# Patient Record
(demographics unavailable — no encounter records)

---

## 2024-12-13 NOTE — DISCUSSION/SUMMARY
[FreeTextEntry1] : Negative ETT NUCLAER at good work load  Target LDL < 70  Target HGAIC < 7  consider ARB  or ACE for baseline AODM  [EKG obtained to assist in diagnosis and management of assessed problem(s)] : EKG obtained to assist in diagnosis and management of assessed problem(s)

## 2024-12-13 NOTE — HISTORY OF PRESENT ILLNESS
[FreeTextEntry1] : 11/3/23  Patient presents for cardiac evaluation this is a 76 year male    with risk factors which include diabetes mellitus hypertension smoking diabetes and family history NO h/o of CAD / CVA , obstructive sleep apnea. Brother has h/o CAD age 73 (non smoker)  Active in general no angina or dyspnea  Has mild douglas with steps  no edema no PVD claudication  12/08/23 recent ETT neg ischemia  baseline RBBB  on AODM  was given meds for LDL   12/13/24 seen here for RBBB and HTN on meds for AODM  -130

## 2024-12-13 NOTE — ASSESSMENT
[FreeTextEntry1] : Negative ETT NUCLEAR  at good work load  Target LDL < 70  Target HGAIC < 7  consider ARB  or ACE for baseline AODM  RBBB   no syncope or near syncope  Lvef 55 MILD AI PI   Lifestyle changes for control of BP reviewed ie wgt reduction, salt restriction, Etoh avoidance, exercise 30 min aerobic activity per day, avoid NSAID use self-monitor BP TIW Lifestyle advice for LDL reduction including reduction of red meat consumption concentrating on a plant based diet. 30 min aerobic exercise per day. calorie reduction to target BMI<25

## 2024-12-13 NOTE — END OF VISIT
[Time Spent: ___ minutes] : I have spent [unfilled] minutes of time on the encounter which excludes teaching and separately reported services. [FreeTextEntry3] :   All medical records entered made by the scribe were at my Dr. Kwaku Khanna's, discretion and personally dictated by me on Dec 13 2024 10:20AM. I have reviewed the chart and agree that the record accuracy reflects my personal performance of the history, physical exam, assessment and plan. I have also personally directed, reviewed and agreed with the chart.

## 2024-12-13 NOTE — ADDENDUM
[FreeTextEntry1] :  Dilshad Barker assisted in documentation on Dec 13 2024 10:20AM acting as a scribe for Dr. Kwaku Khanna

## 2024-12-13 NOTE — REVIEW OF SYSTEMS
[SOB] : shortness of breath [Dyspnea on exertion] : dyspnea during exertion [Negative] : Musculoskeletal